# Patient Record
Sex: FEMALE | Race: BLACK OR AFRICAN AMERICAN | NOT HISPANIC OR LATINO
[De-identification: names, ages, dates, MRNs, and addresses within clinical notes are randomized per-mention and may not be internally consistent; named-entity substitution may affect disease eponyms.]

---

## 2017-10-05 ENCOUNTER — RESULT REVIEW (OUTPATIENT)
Age: 37
End: 2017-10-05

## 2021-04-21 ENCOUNTER — RESULT REVIEW (OUTPATIENT)
Age: 41
End: 2021-04-21

## 2022-09-26 ENCOUNTER — APPOINTMENT (OUTPATIENT)
Dept: ORTHOPEDIC SURGERY | Facility: CLINIC | Age: 42
End: 2022-09-26

## 2022-09-26 VITALS — WEIGHT: 165 LBS | BODY MASS INDEX: 26.52 KG/M2 | HEIGHT: 66 IN

## 2022-09-26 DIAGNOSIS — Z78.9 OTHER SPECIFIED HEALTH STATUS: ICD-10-CM

## 2022-09-26 DIAGNOSIS — M25.562 PAIN IN RIGHT KNEE: ICD-10-CM

## 2022-09-26 DIAGNOSIS — G89.29 PAIN IN RIGHT KNEE: ICD-10-CM

## 2022-09-26 DIAGNOSIS — M25.561 PAIN IN RIGHT KNEE: ICD-10-CM

## 2022-09-26 DIAGNOSIS — M17.12 UNILATERAL PRIMARY OSTEOARTHRITIS, LEFT KNEE: ICD-10-CM

## 2022-09-26 PROCEDURE — 99204 OFFICE O/P NEW MOD 45 MIN: CPT

## 2022-09-26 PROCEDURE — 73564 X-RAY EXAM KNEE 4 OR MORE: CPT | Mod: 50

## 2022-09-26 NOTE — DISCUSSION/SUMMARY
[de-identified] : General Dx Discussion\par The patient was advised of the diagnosis. The natural history of the pathology was explained in full to the patient in layman's terms. All questions were answered. The risks and benefits of surgical and non-surgical treatment alternatives were explained in full to the patient.\par \par  will get a mri rt knee to eval for  a tear rt knee due to swelling and buckling \par wants to defer medication/ injections at this time

## 2022-09-26 NOTE — HISTORY OF PRESENT ILLNESS
[Gradual] : gradual [10] : 10 [8] : 8 [Burning] : burning [Throbbing] : throbbing [Intermittent] : intermittent [Meds] : meds [Ice] : ice [Walking] : walking [] : no [FreeTextEntry1] : bharti knees  [FreeTextEntry5] :  PAT MICHAEL is a 42 year female who is here today for bilateral knee pain. She has been having pain for about 5 years, no known injury. 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  [FreeTextEntry9] : [employer] [de-identified] : running for long period of time  [de-identified] : PCP [de-identified] : Xray

## 2022-09-26 NOTE — IMAGING
[Bilateral] : knee bilaterally [There are no fractures, subluxations or dislocations. No significant abnormalities are seen] : There are no fractures, subluxations or dislocations. No significant abnormalities are seen

## 2022-09-26 NOTE — PHYSICAL EXAM
[Right] : right knee [Equivocal] : equivocal Neil [Left] : left knee [NL (0)] : extension 0 degrees [5___] : hamstring 5[unfilled]/5 [Negative] : negative Radha's [] : ligamentously stable [TWNoteComboBox7] : flexion 120 degrees

## 2022-09-26 NOTE — REASON FOR VISIT
[FreeTextEntry2] : new patient/ bilateral knees \par rt knee pain for 5 years reports injury in the past with swelling and buckling of her rt knee \par reports some stiffness in her lt knee occ.

## 2022-09-29 ENCOUNTER — FORM ENCOUNTER (OUTPATIENT)
Age: 42
End: 2022-09-29

## 2022-09-30 ENCOUNTER — APPOINTMENT (OUTPATIENT)
Dept: MRI IMAGING | Facility: CLINIC | Age: 42
End: 2022-09-30

## 2022-09-30 PROCEDURE — 73721 MRI JNT OF LWR EXTRE W/O DYE: CPT | Mod: RT

## 2022-10-02 ENCOUNTER — TRANSCRIPTION ENCOUNTER (OUTPATIENT)
Age: 42
End: 2022-10-02

## 2022-10-06 ENCOUNTER — APPOINTMENT (OUTPATIENT)
Dept: ORTHOPEDIC SURGERY | Facility: CLINIC | Age: 42
End: 2022-10-06

## 2022-10-06 VITALS — BODY MASS INDEX: 26.52 KG/M2 | HEIGHT: 66 IN | WEIGHT: 165 LBS

## 2022-10-06 DIAGNOSIS — M23.91 UNSPECIFIED INTERNAL DERANGEMENT OF RIGHT KNEE: ICD-10-CM

## 2022-10-06 DIAGNOSIS — M17.11 UNILATERAL PRIMARY OSTEOARTHRITIS, RIGHT KNEE: ICD-10-CM

## 2022-10-06 PROCEDURE — 99214 OFFICE O/P EST MOD 30 MIN: CPT

## 2022-10-06 NOTE — DATA REVIEWED
[MRI] : MRI [Right] : of the right [Knee] : knee [Report was reviewed and noted in the chart] : The report was reviewed and noted in the chart [FreeTextEntry1] : 1. Focal chondral fissuring, subchondral cysts and surrounding subchondral edema in the superior central \par patella measuring 2 cm with slight effusion, mild infrapatellar synovitis and small popliteal cyst.\par 2. No acute ligament tear, meniscal tear or loose body.\par 3. Clinical correlation and follow up is recommended.

## 2022-10-06 NOTE — HISTORY OF PRESENT ILLNESS
[8] : 8 [1] : 2 [Burning] : burning [Dull/Aching] : dull/aching [Throbbing] : throbbing [de-identified] : Here for f/u right knee to discuss mri results. She states today she is feeling ok. No pain, but she still has intermittant pain.  [] : Post Surgical Visit: no [FreeTextEntry1] : right knee  [de-identified] : none

## 2022-10-06 NOTE — PHYSICAL EXAM
[Right] : right knee [Equivocal] : equivocal Neil [Left] : left knee [NL (0)] : extension 0 degrees [5___] : hamstring 5[unfilled]/5 [Negative] : negative Radha's [] : no tenderness [TWNoteComboBox7] : flexion 120 degrees

## 2022-10-06 NOTE — DISCUSSION/SUMMARY
[de-identified] : General Dx Discussion\par The patient was advised of the diagnosis. The natural history of the pathology was explained in full to the patient in layman's terms. All questions were answered. The risks and benefits of surgical and non-surgical treatment alternatives were explained in full to the patient.\par \par Case Discussed.\par Recommend and request authorization for Euflexxa injections right knee.\par Also discussed glucosamine and chondroitin. \par \par Entered by Rani BAUTISTA acting as a scribe.\par Instructions: Dr. Alvarado- The documentation recorded by the scribe accurately reflects the service I personally performed and the decisions made by me.\par \par

## 2022-12-17 ENCOUNTER — NON-APPOINTMENT (OUTPATIENT)
Age: 42
End: 2022-12-17

## 2025-02-27 ENCOUNTER — NON-APPOINTMENT (OUTPATIENT)
Age: 45
End: 2025-02-27